# Patient Record
Sex: FEMALE
[De-identification: names, ages, dates, MRNs, and addresses within clinical notes are randomized per-mention and may not be internally consistent; named-entity substitution may affect disease eponyms.]

---

## 2019-06-27 ENCOUNTER — HOSPITAL ENCOUNTER (EMERGENCY)
Dept: HOSPITAL 5 - ED | Age: 54
Discharge: HOME | End: 2019-06-27
Payer: COMMERCIAL

## 2019-06-27 VITALS — SYSTOLIC BLOOD PRESSURE: 177 MMHG | DIASTOLIC BLOOD PRESSURE: 96 MMHG

## 2019-06-27 DIAGNOSIS — F17.200: ICD-10-CM
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
## 2019-06-27 DIAGNOSIS — Z86.73: ICD-10-CM

## 2019-06-27 DIAGNOSIS — S52.602A: ICD-10-CM

## 2019-06-27 DIAGNOSIS — S52.502A: Primary | ICD-10-CM

## 2019-06-27 DIAGNOSIS — Z79.899: ICD-10-CM

## 2019-06-27 DIAGNOSIS — I10: ICD-10-CM
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
## 2019-06-27 DIAGNOSIS — Z88.8: ICD-10-CM

## 2019-06-27 NOTE — XRAY REPORT
PROCEDURE: XR FOREARM 1V LT 

 

TECHNIQUE:  Single lateral view of the left forearm 

 

HISTORY: arm pain deformity 

 

COMPARISONS: 

 

FINDINGS: 

 

Demonstrator acute transverse fractures of the distal ulna and distal radius. There is dorsal displac
ement of the distal fragments. 

 

IMPRESSION:  

 

Acute displaced distal radial ulnar fractures. 

 

This document is electronically signed by Eduardo Wood MD., June 27 2019 05:51:19 PM ET

## 2019-06-27 NOTE — XRAY REPORT
PROCEDURE: XR WRIST 2V LT 

 

TECHNIQUE:  2 views of the left wrist. 

 

HISTORY: arm pain deformity 

 

COMPARISONS: None.  

 

FINDINGS: 

 

There are acute, transverse fractures through the left distal radius and ulna. There is posterior ang
ulation of the radius fracture with impaction of fracture fragments. There is diffuse osteopenia. Sof
t tissue swelling is seen surrounding the left wrist. 

 

IMPRESSION:  

 

Acute left distal radius and ulna fractures.. 

 

This document is electronically signed by Elizabeth Oseguera., June 27 2019 05:51:48 PM ET

## 2019-06-27 NOTE — EMERGENCY DEPARTMENT REPORT
ED Upper Extremity Inj HPI





- General


Chief Complaint: Extremity Injury, Lower


Stated Complaint: L ARM INJURY


Time Seen by Provider: 06/27/19 16:56


Source: patient


Mode of arrival: Ambulatory


Limitations: Physical Limitation





- History of Present Illness


Initial Comments: 





This is a 53-year-old female nontoxic, well nourished in appearance, no acute 

signs of distress presents to the ED with c/o of left wrist pain 1 day.  

Patient stated that she had a ground level mechanical fall.  Patient denies any 

other trauma.  Patient denies any numbness, tingling, fever, chills, nausea, 

vomiting, chest pain, shortness of breath, headache, stiff neck.  Patient denies

any joint swelling or joint redness.  Patient has some decreaed range of motion.

 Patient stated allergies to Prednisone.


MD Complaint: Injury to:: left, wrist


-: This evening


Other Extremity Injury: Wrist: Left


Other Injuries: none


Place: home


Severity scale (0 -10): 8


Improves With: immobilization


Worsens With: movement of extremity


Context: fall


Associated Symptoms: denies other symptoms.  denies: weakness, numbness, neck 

pain, suspects foreign body, nausea/vomiting





- Related Data


                                  Previous Rx's











 Medication  Instructions  Recorded  Last Taken  Type


 


HYDROcodone/APAP  [[place]co 1 each PO Q8HR PRN #20 tablet 06/27/19 Unknown Rx





10/325]    











                                    Allergies











Allergy/AdvReac Type Severity Reaction Status Date / Time


 


prednisone Allergy  Unknown Verified 12/28/15 19:03














ED Review of Systems


ROS: 


Stated complaint: L ARM INJURY


Other details as noted in HPI





Constitutional: denies: chills, fever


Eyes: denies: eye pain, eye discharge, vision change


ENT: denies: ear pain, throat pain


Respiratory: denies: cough, shortness of breath, wheezing


Cardiovascular: denies: chest pain, palpitations


Endocrine: no symptoms reported


Gastrointestinal: denies: abdominal pain, nausea, diarrhea


Genitourinary: denies: urgency, dysuria, discharge


Musculoskeletal: denies: back pain, joint swelling, arthralgia


Skin: denies: rash, lesions


Neurological: denies: headache, weakness, paresthesias


Psychiatric: denies: anxiety, depression


Hematological/Lymphatic: denies: easy bleeding, easy bruising





ED Past Medical Hx





- Past Medical History


Previous Medical History?: Yes


Hx Hypertension: Yes


Hx CVA: Yes





- Surgical History


Past Surgical History?: Yes


Additional Surgical History: Right wrist, ankle, knee





- Social History


Smoking Status: Current Every Day Smoker


Substance Use Type: None





- Medications


Home Medications: 


                                Home Medications











 Medication  Instructions  Recorded  Confirmed  Last Taken  Type


 


HYDROcodone/APAP  [Norco 1 each PO Q8HR PRN #20 tablet 06/27/19  Unknown 

Rx





10/325]     














ED Physical Exam





- General


Limitations: Physical Limitation


General appearance: alert, in no apparent distress





- Head


Head exam: Present: atraumatic, normocephalic





- Eye


Eye exam: Present: normal appearance





- Neck


Neck exam: Present: normal inspection, full ROM.  Absent: tenderness, 

meningismus, lymphadenopathy





- Extremities Exam


Extremities exam: Present: tenderness, normal capillary refill.  Absent: joint 

swelling





- Expanded Upper Extremity Exam


  ** Left


General: Present: normal inspection


Shoulder Exam: Present: normal inspection, full ROM.  Absent: tenderness, 

swelling


Upper Arm exam: Present: normal inspection, full ROM.  Absent: tenderness, 

swelling


Elbow exam: Present: normal inspection, full ROM.  Absent: tenderness, swelling


Forearm Wrist exam: Present: tenderness, ecchymosis, deformity.  Absent: 

swelling, abrasion, laceration, crepidus, dislocation, erythema, tenderness over

anatomical snuff box, pain with axial thumb loading


Hand Wrist exam: Present: normal inspection, full ROM.  Absent: tenderness, 

swelling


Vascular: Present: vascular compromise, normal capillary refill





- Back Exam


Back exam: Present: normal inspection, full ROM.  Absent: tenderness, CVA 

tenderness (R), CVA tenderness (L), muscle spasm, paraspinal tenderness, 

vertebral tenderness, rash noted





- Neurological Exam


Neurological exam: Present: alert, oriented X3, normal gait





- Psychiatric


Psychiatric exam: Present: normal affect, normal mood





- Skin


Skin exam: Present: warm, dry, intact, normal color.  Absent: rash





ED Course


                                   Vital Signs











  06/27/19





  16:58


 


Temperature 97.7 F


 


Pulse Rate 74


 


Respiratory 22





Rate 


 


Blood Pressure 177/96


 


O2 Sat by Pulse 97





Oximetry 














- Reevaluation(s)


Reevaluation #1: 





06/27/19 18:12


Patient is speaking in full sentences with no signs of distress noted.





- Consultations


Consultation #1: 





06/27/19 18:14


Patient has been consulted with Dr. Kalyan v about patient history, physical 

exam, and xray results and no reduction need at this time and patient needs 

ortho follow-up for possible ORIF.





ED Medical Decision Making





- Medical Decision Making





This is a 53-year-old female that presents with left ulnar and radius fractures.

 Patient is stable and was examined by me.  X-ray has been obtained and dictated

by the radiologist.  Patient is notified of the x-ray report with noted by the 

patient.  Patient received a sugar tong splint with sling.  Post splint 

assessment: neurovasular intact; normal cap refill <2 second; normal sensation; 

denies decreaed sensation; normal ROM of digits.  Patient was instructed to RICE

therapy.  Patient is discharged with NOrco and was instructed not to operate any

machinery while taking this. At time of discharge, the patient does not seem 

toxic or ill in appearance.  No acute signs of distress noted.  Patient agrees 

to discharge treatment plan of care.  No further questions noted by the patient.


Critical care attestation.: 


If time is entered above; I have spent that time in minutes in the direct care 

of this critically ill patient, excluding procedure time.








ED Disposition


Clinical Impression: 


Left radial fracture


Qualifiers:


 Encounter type: initial encounter Radius location: distal Fracture type: closed

Fracture morphology: unspecified fracture morphology Qualified Code(s): S52.502A

- Unspecified fracture of the lower end of left radius, initial encounter for 

closed fracture





Left ulnar fracture


Qualifiers:


 Encounter type: initial encounter Ulna location: distal Fracture type: closed 

Fracture morphology: unspecified fracture morphology Qualified Code(s): S52.602A

- Unspecified fracture of lower end of left ulna, initial encounter for closed 

fracture





Disposition: DC-01 TO HOME OR SELFCARE


Is pt being admited?: No


Does the pt Need Aspirin: No


Condition: Stable


Instructions:  Wrist Fracture in Adults (ED), Splint Care (ED), RICE Therapy 

(ED)


Additional Instructions: 


Follow-up with a   orthopedic doctor in 3-5 days or if symptoms worsen and 

continue return to emergency room as soon as possible. 


Do not operate any machinery while taking Norco as this may cause drowsiness.


Prescriptions: 


HYDROcodone/APAP  [[place] 10/325] 1 each PO Q8HR PRN #20 tablet


 PRN Reason: Pain


Referrals: 


[place] [facility]5 Days


GARFIELD HURD MD [Staff Physician] - 3-5 Days


[facility] [Outside] - 3-5 Days


[facility] [Outside] - 3-5 Days


Forms:  Work/School Release Form(ED)

## 2019-06-27 NOTE — EMERGENCY DEPARTMENT REPORT
Blank Doc





- Documentation


Documentation: 


53 y o female presenst with left wrist pain s/p trip and fall


no loc ,